# Patient Record
Sex: FEMALE | Race: BLACK OR AFRICAN AMERICAN | NOT HISPANIC OR LATINO | Employment: FULL TIME | URBAN - METROPOLITAN AREA
[De-identification: names, ages, dates, MRNs, and addresses within clinical notes are randomized per-mention and may not be internally consistent; named-entity substitution may affect disease eponyms.]

---

## 2017-01-03 ENCOUNTER — HOSPITAL ENCOUNTER (EMERGENCY)
Facility: HOSPITAL | Age: 27
Discharge: HOME | End: 2017-01-03
Attending: EMERGENCY MEDICINE
Payer: COMMERCIAL

## 2017-01-03 VITALS
OXYGEN SATURATION: 100 % | SYSTOLIC BLOOD PRESSURE: 126 MMHG | WEIGHT: 184 LBS | RESPIRATION RATE: 18 BRPM | TEMPERATURE: 98.4 F | HEART RATE: 77 BPM | DIASTOLIC BLOOD PRESSURE: 82 MMHG

## 2017-01-03 DIAGNOSIS — J20.9 ACUTE BRONCHITIS, UNSPECIFIED ORGANISM: Primary | ICD-10-CM

## 2017-01-03 PROCEDURE — 99283 EMERGENCY DEPT VISIT LOW MDM: CPT

## 2017-01-03 RX ORDER — ALBUTEROL SULFATE 90 UG/1
2 AEROSOL, METERED RESPIRATORY (INHALATION) EVERY 6 HOURS PRN
Qty: 1 INHALER | Refills: 0 | Status: SHIPPED | OUTPATIENT
Start: 2017-01-03 | End: 2017-01-13

## 2017-01-03 RX ORDER — MOMETASONE FUROATE 50 UG/1
2 SPRAY, METERED NASAL DAILY
Qty: 17 G | Refills: 0 | Status: SHIPPED | OUTPATIENT
Start: 2017-01-03 | End: 2019-11-10

## 2017-01-03 RX ORDER — AZITHROMYCIN 250 MG/1
TABLET, FILM COATED ORAL
Qty: 6 TABLET | Refills: 0 | Status: SHIPPED | OUTPATIENT
Start: 2017-01-03 | End: 2017-02-07

## 2017-01-03 RX ORDER — PREDNISONE 20 MG/1
60 TABLET ORAL DAILY
Qty: 15 TABLET | Refills: 0 | Status: SHIPPED | OUTPATIENT
Start: 2017-01-03 | End: 2017-01-08

## 2017-01-03 RX ORDER — CETIRIZINE HYDROCHLORIDE, PSEUDOEPHEDRINE HYDROCHLORIDE 5; 120 MG/1; MG/1
1 TABLET, FILM COATED, EXTENDED RELEASE ORAL 2 TIMES DAILY
Qty: 28 TABLET | Refills: 0 | Status: SHIPPED | OUTPATIENT
Start: 2017-01-03 | End: 2017-01-17

## 2017-02-08 ENCOUNTER — HOSPITAL ENCOUNTER (EMERGENCY)
Facility: HOSPITAL | Age: 27
Discharge: HOME | End: 2017-02-08
Attending: EMERGENCY MEDICINE | Admitting: EMERGENCY MEDICINE
Payer: COMMERCIAL

## 2017-02-08 VITALS
WEIGHT: 197.8 LBS | TEMPERATURE: 98.1 F | OXYGEN SATURATION: 100 % | HEART RATE: 56 BPM | DIASTOLIC BLOOD PRESSURE: 74 MMHG | BODY MASS INDEX: 36.4 KG/M2 | RESPIRATION RATE: 18 BRPM | SYSTOLIC BLOOD PRESSURE: 142 MMHG | HEIGHT: 62 IN

## 2017-02-08 DIAGNOSIS — N92.0 MENORRHAGIA: Primary | ICD-10-CM

## 2017-02-08 LAB
ANION GAP SERPL CALCULATED.3IONS-SCNC: 9 MMOL/L (ref 4–13)
APTT PPP: 31 SECONDS (ref 24–36)
BACTERIA UR QL AUTO: ABNORMAL /HPF
BASOPHILS # BLD AUTO: 0.03 THOUSANDS/ΜL (ref 0–0.1)
BASOPHILS NFR BLD AUTO: 0 % (ref 0–1)
BILIRUB UR QL STRIP: NEGATIVE
BUN SERPL-MCNC: 11 MG/DL (ref 5–25)
CALCIUM SERPL-MCNC: 8.9 MG/DL (ref 8.3–10.1)
CHLAMYDIA DNA CVX QL NAA+PROBE: NORMAL
CHLORIDE SERPL-SCNC: 108 MMOL/L (ref 100–108)
CLARITY UR: CLEAR
CLARITY, POC: NORMAL
CO2 SERPL-SCNC: 23 MMOL/L (ref 21–32)
COLOR UR: YELLOW
COLOR, POC: NORMAL
CREAT SERPL-MCNC: 0.73 MG/DL (ref 0.6–1.3)
EOSINOPHIL # BLD AUTO: 0.37 THOUSAND/ΜL (ref 0–0.61)
EOSINOPHIL NFR BLD AUTO: 5 % (ref 0–6)
ERYTHROCYTE [DISTWIDTH] IN BLOOD BY AUTOMATED COUNT: 14.2 % (ref 11.6–15.1)
EXT BILIRUBIN, UA: NORMAL
EXT BLOOD URINE: NORMAL
EXT GLUCOSE, UA: NORMAL
EXT KETONES: NORMAL
EXT NITRITE, UA: NORMAL
EXT PH, UA: 5
EXT PROTEIN, UA: NORMAL
EXT SPECIFIC GRAVITY, UA: 1.02
EXT UROBILINOGEN: 0.2
GFR SERPL CREATININE-BSD FRML MDRD: >60 ML/MIN/1.73SQ M
GLUCOSE SERPL-MCNC: 109 MG/DL (ref 65–140)
GLUCOSE UR STRIP-MCNC: NEGATIVE MG/DL
HCG UR QL: NORMAL
HCT VFR BLD AUTO: 34.1 % (ref 34.8–46.1)
HGB BLD-MCNC: 11.2 G/DL (ref 11.5–15.4)
HGB UR QL STRIP.AUTO: ABNORMAL
INR PPP: 0.96 (ref 0.86–1.16)
KETONES UR STRIP-MCNC: NEGATIVE MG/DL
LEUKOCYTE ESTERASE UR QL STRIP: NEGATIVE
LYMPHOCYTES # BLD AUTO: 3.2 THOUSANDS/ΜL (ref 0.6–4.47)
LYMPHOCYTES NFR BLD AUTO: 44 % (ref 14–44)
MCH RBC QN AUTO: 27.7 PG (ref 26.8–34.3)
MCHC RBC AUTO-ENTMCNC: 32.8 G/DL (ref 31.4–37.4)
MCV RBC AUTO: 84 FL (ref 82–98)
MONOCYTES # BLD AUTO: 0.53 THOUSAND/ΜL (ref 0.17–1.22)
MONOCYTES NFR BLD AUTO: 7 % (ref 4–12)
N GONORRHOEA DNA GENITAL QL NAA+PROBE: NORMAL
NEUTROPHILS # BLD AUTO: 3.17 THOUSANDS/ΜL (ref 1.85–7.62)
NEUTS SEG NFR BLD AUTO: 43 % (ref 43–75)
NITRITE UR QL STRIP: NEGATIVE
NON-SQ EPI CELLS URNS QL MICRO: ABNORMAL /HPF
NRBC BLD AUTO-RTO: 0 /100 WBCS
PH UR STRIP.AUTO: 5 [PH] (ref 4.5–8)
PLATELET # BLD AUTO: 269 THOUSANDS/UL (ref 149–390)
PMV BLD AUTO: 10.9 FL (ref 8.9–12.7)
POTASSIUM SERPL-SCNC: 5.1 MMOL/L (ref 3.5–5.3)
PROT UR STRIP-MCNC: NEGATIVE MG/DL
PROTHROMBIN TIME: 12.7 SECONDS (ref 12–14.3)
RBC # BLD AUTO: 4.05 MILLION/UL (ref 3.81–5.12)
RBC #/AREA URNS AUTO: ABNORMAL /HPF
SODIUM SERPL-SCNC: 140 MMOL/L (ref 136–145)
SP GR UR STRIP.AUTO: >=1.03 (ref 1–1.03)
UROBILINOGEN UR QL STRIP.AUTO: 0.2 E.U./DL
WBC # BLD AUTO: 7.31 THOUSAND/UL (ref 4.31–10.16)
WBC # BLD EST: NORMAL 10*3/UL
WBC #/AREA URNS AUTO: ABNORMAL /HPF

## 2017-02-08 PROCEDURE — 87591 N.GONORRHOEAE DNA AMP PROB: CPT | Performed by: EMERGENCY MEDICINE

## 2017-02-08 PROCEDURE — 87086 URINE CULTURE/COLONY COUNT: CPT | Performed by: EMERGENCY MEDICINE

## 2017-02-08 PROCEDURE — 99283 EMERGENCY DEPT VISIT LOW MDM: CPT

## 2017-02-08 PROCEDURE — 81025 URINE PREGNANCY TEST: CPT | Performed by: EMERGENCY MEDICINE

## 2017-02-08 PROCEDURE — 85025 COMPLETE CBC W/AUTO DIFF WBC: CPT | Performed by: EMERGENCY MEDICINE

## 2017-02-08 PROCEDURE — 80048 BASIC METABOLIC PNL TOTAL CA: CPT | Performed by: EMERGENCY MEDICINE

## 2017-02-08 PROCEDURE — 81001 URINALYSIS AUTO W/SCOPE: CPT | Performed by: EMERGENCY MEDICINE

## 2017-02-08 PROCEDURE — 36415 COLL VENOUS BLD VENIPUNCTURE: CPT | Performed by: EMERGENCY MEDICINE

## 2017-02-08 PROCEDURE — 85730 THROMBOPLASTIN TIME PARTIAL: CPT | Performed by: EMERGENCY MEDICINE

## 2017-02-08 PROCEDURE — 85610 PROTHROMBIN TIME: CPT | Performed by: EMERGENCY MEDICINE

## 2017-02-08 PROCEDURE — 87491 CHLMYD TRACH DNA AMP PROBE: CPT | Performed by: EMERGENCY MEDICINE

## 2017-02-08 PROCEDURE — 87147 CULTURE TYPE IMMUNOLOGIC: CPT | Performed by: EMERGENCY MEDICINE

## 2017-02-08 PROCEDURE — 81002 URINALYSIS NONAUTO W/O SCOPE: CPT | Performed by: EMERGENCY MEDICINE

## 2017-02-08 RX ORDER — IBUPROFEN 600 MG/1
600 TABLET ORAL ONCE
Status: DISCONTINUED | OUTPATIENT
Start: 2017-02-08 | End: 2017-02-08 | Stop reason: HOSPADM

## 2017-02-08 RX ORDER — IBUPROFEN 600 MG/1
600 TABLET ORAL EVERY 6 HOURS PRN
Qty: 30 TABLET | Refills: 0 | Status: SHIPPED | OUTPATIENT
Start: 2017-02-08 | End: 2019-11-10

## 2017-02-10 LAB
BACTERIA UR CULT: NORMAL
BACTERIA UR CULT: NORMAL

## 2017-03-24 ENCOUNTER — HOSPITAL ENCOUNTER (EMERGENCY)
Facility: HOSPITAL | Age: 27
Discharge: HOME | End: 2017-03-24
Attending: EMERGENCY MEDICINE
Payer: COMMERCIAL

## 2017-03-24 VITALS
OXYGEN SATURATION: 100 % | SYSTOLIC BLOOD PRESSURE: 132 MMHG | BODY MASS INDEX: 36.85 KG/M2 | TEMPERATURE: 98 F | DIASTOLIC BLOOD PRESSURE: 69 MMHG | RESPIRATION RATE: 18 BRPM | WEIGHT: 201.5 LBS | HEART RATE: 66 BPM

## 2017-03-24 DIAGNOSIS — K29.70 GASTRITIS: Primary | ICD-10-CM

## 2017-03-24 LAB
ALBUMIN SERPL BCP-MCNC: 3.9 G/DL (ref 3.5–5)
ALP SERPL-CCNC: 104 U/L (ref 46–116)
ALT SERPL W P-5'-P-CCNC: 21 U/L (ref 12–78)
ANION GAP SERPL CALCULATED.3IONS-SCNC: 7 MMOL/L (ref 4–13)
AST SERPL W P-5'-P-CCNC: 17 U/L (ref 5–45)
BASOPHILS # BLD AUTO: 0.04 THOUSANDS/ΜL (ref 0–0.1)
BASOPHILS NFR BLD AUTO: 1 % (ref 0–1)
BILIRUB SERPL-MCNC: 0.2 MG/DL (ref 0.2–1)
BILIRUB UR QL STRIP: NEGATIVE
BUN SERPL-MCNC: 16 MG/DL (ref 5–25)
CALCIUM SERPL-MCNC: 8.9 MG/DL (ref 8.3–10.1)
CHLORIDE SERPL-SCNC: 105 MMOL/L (ref 100–108)
CLARITY UR: CLEAR
CO2 SERPL-SCNC: 27 MMOL/L (ref 21–32)
COLOR UR: YELLOW
CREAT SERPL-MCNC: 0.74 MG/DL (ref 0.6–1.3)
EOSINOPHIL # BLD AUTO: 0.17 THOUSAND/ΜL (ref 0–0.61)
EOSINOPHIL NFR BLD AUTO: 2 % (ref 0–6)
ERYTHROCYTE [DISTWIDTH] IN BLOOD BY AUTOMATED COUNT: 14.6 % (ref 11.6–15.1)
GFR SERPL CREATININE-BSD FRML MDRD: >60 ML/MIN/1.73SQ M
GLUCOSE SERPL-MCNC: 83 MG/DL (ref 65–140)
GLUCOSE UR STRIP-MCNC: NEGATIVE MG/DL
HCG UR QL: NORMAL
HCT VFR BLD AUTO: 38 % (ref 34.8–46.1)
HGB BLD-MCNC: 12.4 G/DL (ref 11.5–15.4)
HGB UR QL STRIP.AUTO: NEGATIVE
KETONES UR STRIP-MCNC: NEGATIVE MG/DL
LEUKOCYTE ESTERASE UR QL STRIP: NEGATIVE
LIPASE SERPL-CCNC: 179 U/L (ref 73–393)
LYMPHOCYTES # BLD AUTO: 1.95 THOUSANDS/ΜL (ref 0.6–4.47)
LYMPHOCYTES NFR BLD AUTO: 22 % (ref 14–44)
MCH RBC QN AUTO: 27.3 PG (ref 26.8–34.3)
MCHC RBC AUTO-ENTMCNC: 32.6 G/DL (ref 31.4–37.4)
MCV RBC AUTO: 84 FL (ref 82–98)
MONOCYTES # BLD AUTO: 0.64 THOUSAND/ΜL (ref 0.17–1.22)
MONOCYTES NFR BLD AUTO: 7 % (ref 4–12)
NEUTROPHILS # BLD AUTO: 6.04 THOUSANDS/ΜL (ref 1.85–7.62)
NEUTS SEG NFR BLD AUTO: 68 % (ref 43–75)
NITRITE UR QL STRIP: NEGATIVE
NRBC BLD AUTO-RTO: 0 /100 WBCS
PH UR STRIP.AUTO: 6.5 [PH] (ref 4.5–8)
PLATELET # BLD AUTO: 315 THOUSANDS/UL (ref 149–390)
PMV BLD AUTO: 10.6 FL (ref 8.9–12.7)
POTASSIUM SERPL-SCNC: 4.4 MMOL/L (ref 3.5–5.3)
PROT SERPL-MCNC: 7.5 G/DL (ref 6.4–8.2)
PROT UR STRIP-MCNC: NEGATIVE MG/DL
RBC # BLD AUTO: 4.55 MILLION/UL (ref 3.81–5.12)
SODIUM SERPL-SCNC: 139 MMOL/L (ref 136–145)
SP GR UR STRIP.AUTO: 1.02 (ref 1–1.03)
UROBILINOGEN UR QL STRIP.AUTO: 0.2 E.U./DL
WBC # BLD AUTO: 8.87 THOUSAND/UL (ref 4.31–10.16)

## 2017-03-24 PROCEDURE — 81025 URINE PREGNANCY TEST: CPT | Performed by: EMERGENCY MEDICINE

## 2017-03-24 PROCEDURE — 80053 COMPREHEN METABOLIC PANEL: CPT | Performed by: EMERGENCY MEDICINE

## 2017-03-24 PROCEDURE — 85025 COMPLETE CBC W/AUTO DIFF WBC: CPT | Performed by: EMERGENCY MEDICINE

## 2017-03-24 PROCEDURE — 81003 URINALYSIS AUTO W/O SCOPE: CPT | Performed by: EMERGENCY MEDICINE

## 2017-03-24 PROCEDURE — 99284 EMERGENCY DEPT VISIT MOD MDM: CPT

## 2017-03-24 PROCEDURE — 36415 COLL VENOUS BLD VENIPUNCTURE: CPT | Performed by: EMERGENCY MEDICINE

## 2017-03-24 PROCEDURE — 83690 ASSAY OF LIPASE: CPT | Performed by: EMERGENCY MEDICINE

## 2017-03-24 RX ORDER — MAGNESIUM HYDROXIDE/ALUMINUM HYDROXICE/SIMETHICONE 120; 1200; 1200 MG/30ML; MG/30ML; MG/30ML
30 SUSPENSION ORAL ONCE
Status: COMPLETED | OUTPATIENT
Start: 2017-03-24 | End: 2017-03-24

## 2017-03-24 RX ORDER — ALBUTEROL SULFATE 90 UG/1
2 AEROSOL, METERED RESPIRATORY (INHALATION) EVERY 4 HOURS PRN
COMMUNITY

## 2017-03-24 RX ORDER — LANSOPRAZOLE 30 MG/1
30 CAPSULE, DELAYED RELEASE ORAL DAILY
Qty: 14 CAPSULE | Refills: 0 | Status: SHIPPED | OUTPATIENT
Start: 2017-03-24 | End: 2019-11-10

## 2017-03-24 RX ADMIN — LIDOCAINE HYDROCHLORIDE 10 ML: 20 SOLUTION ORAL; TOPICAL at 17:12

## 2017-03-24 RX ADMIN — ALUMINUM HYDROXIDE, MAGNESIUM HYDROXIDE, AND SIMETHICONE 30 ML: 200; 200; 20 SUSPENSION ORAL at 17:12

## 2017-04-08 ENCOUNTER — HOSPITAL ENCOUNTER (EMERGENCY)
Facility: HOSPITAL | Age: 27
Discharge: HOME | End: 2017-04-08
Attending: EMERGENCY MEDICINE | Admitting: EMERGENCY MEDICINE
Payer: COMMERCIAL

## 2017-04-08 VITALS
TEMPERATURE: 98.4 F | HEART RATE: 72 BPM | OXYGEN SATURATION: 100 % | DIASTOLIC BLOOD PRESSURE: 67 MMHG | SYSTOLIC BLOOD PRESSURE: 157 MMHG | WEIGHT: 216.93 LBS | RESPIRATION RATE: 16 BRPM | BODY MASS INDEX: 39.68 KG/M2

## 2017-04-08 DIAGNOSIS — Z20.2 STD EXPOSURE: Primary | ICD-10-CM

## 2017-04-08 LAB — HCG UR QL: POSITIVE

## 2017-04-08 PROCEDURE — 99283 EMERGENCY DEPT VISIT LOW MDM: CPT

## 2017-04-08 PROCEDURE — 87491 CHLMYD TRACH DNA AMP PROBE: CPT | Performed by: EMERGENCY MEDICINE

## 2017-04-08 PROCEDURE — 87591 N.GONORRHOEAE DNA AMP PROB: CPT | Performed by: EMERGENCY MEDICINE

## 2017-04-08 PROCEDURE — 81025 URINE PREGNANCY TEST: CPT | Performed by: EMERGENCY MEDICINE

## 2017-04-08 RX ORDER — AZITHROMYCIN 250 MG/1
1000 TABLET, FILM COATED ORAL ONCE
Status: COMPLETED | OUTPATIENT
Start: 2017-04-08 | End: 2017-04-08

## 2017-04-08 RX ADMIN — AZITHROMYCIN 1000 MG: 250 TABLET, FILM COATED ORAL at 20:11

## 2017-04-10 LAB
CHLAMYDIA DNA CVX QL NAA+PROBE: NORMAL
N GONORRHOEA DNA GENITAL QL NAA+PROBE: NORMAL

## 2017-08-08 ENCOUNTER — HOSPITAL ENCOUNTER (EMERGENCY)
Facility: HOSPITAL | Age: 27
Discharge: LEFT BEFORE DISCHARGE | End: 2017-08-08
Attending: EMERGENCY MEDICINE

## 2017-08-08 VITALS
WEIGHT: 211.2 LBS | SYSTOLIC BLOOD PRESSURE: 127 MMHG | HEART RATE: 64 BPM | BODY MASS INDEX: 38.87 KG/M2 | HEIGHT: 62 IN | OXYGEN SATURATION: 100 % | RESPIRATION RATE: 18 BRPM | DIASTOLIC BLOOD PRESSURE: 60 MMHG | TEMPERATURE: 97.6 F

## 2017-08-08 DIAGNOSIS — Z3A.22 22 WEEKS GESTATION OF PREGNANCY: ICD-10-CM
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
## 2017-08-08 DIAGNOSIS — Z71.1 WORRIED WELL: ICD-10-CM

## 2017-08-08 PROCEDURE — 99284 EMERGENCY DEPT VISIT MOD MDM: CPT

## 2019-11-10 ENCOUNTER — HOSPITAL ENCOUNTER (EMERGENCY)
Facility: HOSPITAL | Age: 29
Discharge: HOME | End: 2019-11-10
Attending: EMERGENCY MEDICINE | Admitting: EMERGENCY MEDICINE
Payer: COMMERCIAL

## 2019-11-10 VITALS
DIASTOLIC BLOOD PRESSURE: 74 MMHG | SYSTOLIC BLOOD PRESSURE: 146 MMHG | TEMPERATURE: 98.5 F | BODY MASS INDEX: 47.58 KG/M2 | WEIGHT: 260.14 LBS | RESPIRATION RATE: 18 BRPM | OXYGEN SATURATION: 98 % | HEART RATE: 47 BPM

## 2019-11-10 DIAGNOSIS — R25.2 MUSCLE CRAMPS: ICD-10-CM

## 2019-11-10 DIAGNOSIS — M72.2 PLANTAR FASCIITIS: Primary | ICD-10-CM

## 2019-11-10 LAB
ANION GAP SERPL CALCULATED.3IONS-SCNC: 10 MMOL/L (ref 4–13)
BUN SERPL-MCNC: 8 MG/DL (ref 5–25)
CALCIUM SERPL-MCNC: 9.5 MG/DL (ref 8.3–10.1)
CHLORIDE SERPL-SCNC: 103 MMOL/L (ref 100–108)
CO2 SERPL-SCNC: 25 MMOL/L (ref 21–32)
CREAT SERPL-MCNC: 0.73 MG/DL (ref 0.6–1.3)
GFR SERPL CREATININE-BSD FRML MDRD: 129 ML/MIN/1.73SQ M
GLUCOSE SERPL-MCNC: 99 MG/DL (ref 65–140)
POTASSIUM SERPL-SCNC: 4.2 MMOL/L (ref 3.5–5.3)
SODIUM SERPL-SCNC: 138 MMOL/L (ref 136–145)

## 2019-11-10 PROCEDURE — 99283 EMERGENCY DEPT VISIT LOW MDM: CPT

## 2019-11-10 PROCEDURE — 36415 COLL VENOUS BLD VENIPUNCTURE: CPT | Performed by: EMERGENCY MEDICINE

## 2019-11-10 PROCEDURE — 80048 BASIC METABOLIC PNL TOTAL CA: CPT | Performed by: EMERGENCY MEDICINE

## 2019-11-10 PROCEDURE — 99284 EMERGENCY DEPT VISIT MOD MDM: CPT | Performed by: EMERGENCY MEDICINE

## 2019-11-10 NOTE — ED PROVIDER NOTES
History  Chief Complaint   Patient presents with    Foot Pain     Pt states she had burning on the bottom of her foor [id number] and occassionally gets "michelle horses" in her legs  HPI     30-year-old female with history of asthma presenting for evaluation of burning pain in the bottom of both of her feet that has been present for the last month  Occurs when she is standing on her feet for long periods of time at work or when she walks long distances  Not present at rest   Also reports occasional charley horses throughout her legs  No calf swelling or tenderness  No history of DVT or PE  No history of diabetes the patient states that I am here because I want make sure that I do not have diabetes or high blood pressure    Denies fevers, chills, nausea, vomiting, or trauma  Prior to Admission Medications   Prescriptions Last Dose Informant Patient Reported? Taking? albuterol (PROVENTIL HFA,VENTOLIN HFA) 90 mcg/act inhaler   Yes No   Sig: Inhale 2 puffs every 4 (four) hours as needed for wheezing   levonorgestrel (MIRENA) 20 MCG/24HR IUD   Yes Yes   Si each by Intrauterine route      Facility-Administered Medications: None       Past Medical History:   Diagnosis Date    Asthma        History reviewed  No pertinent surgical history  History reviewed  No pertinent family history  I have reviewed and agree with the history as documented  Social History     Tobacco Use    Smoking status: Never Smoker   Substance Use Topics    Alcohol use: No    Drug use: No        Review of Systems   Constitutional: Negative for chills and fever  HENT: Negative for congestion  Eyes: Negative for visual disturbance  Respiratory: Negative for cough and shortness of breath  Cardiovascular: Negative for chest pain and leg swelling  Gastrointestinal: Negative for abdominal pain, diarrhea, nausea and vomiting  Genitourinary: Negative for dysuria and frequency  Musculoskeletal: Positive for myalgias  Negative for arthralgias, back pain, neck pain and neck stiffness  Skin: Negative for rash  Neurological: Negative for weakness, numbness and headaches  Psychiatric/Behavioral: Negative for agitation, behavioral problems and confusion  Physical Exam  Physical Exam   Constitutional: She is oriented to person, place, and time  No distress  obese   HENT:   Head: Normocephalic and atraumatic  Right Ear: External ear normal    Left Ear: External ear normal    Nose: Nose normal    Mouth/Throat: Oropharynx is clear and moist    Eyes: Conjunctivae are normal    Neck: Normal range of motion  Neck supple  Cardiovascular: Normal rate, regular rhythm and normal heart sounds  Exam reveals no gallop and no friction rub  No murmur heard  Pulmonary/Chest: Effort normal and breath sounds normal  No respiratory distress  She has no wheezes  She has no rales  Abdominal: Soft  Bowel sounds are normal  She exhibits no distension  There is no tenderness  There is no guarding  Musculoskeletal: Normal range of motion  She exhibits no edema or deformity  TTP over the plantar fascia bilaterally  No calf swelling or tenderness  Neurological: She is alert and oriented to person, place, and time  She exhibits normal muscle tone  Intact sensation to light touch in the bilateral LEs   Skin: Skin is warm and dry  She is not diaphoretic         Vital Signs  ED Triage Vitals   Temperature Pulse Respirations Blood Pressure SpO2   11/10/19 1312 11/10/19 1314 11/10/19 1314 11/10/19 1314 11/10/19 1314   98 5 °F (36 9 °C) 63 18 145/73 98 %      Temp Source Heart Rate Source Patient Position - Orthostatic VS BP Location FiO2 (%)   11/10/19 1312 11/10/19 1314 11/10/19 1314 11/10/19 1314 --   Oral Monitor Sitting Right arm       Pain Score       --                  Vitals:    11/10/19 1314 11/10/19 1546   BP: 145/73 146/74   Pulse: 63 (!) 47   Patient Position - Orthostatic VS: Sitting          Visual Acuity      ED Medications  Medications - No data to display    Diagnostic Studies  Results Reviewed     Procedure Component Value Units Date/Time    Basic metabolic panel [[id number]] Collected:  11/10/19 1444    Lab Status:  Final result Specimen:  Blood from Hand, Right Updated:  11/10/19 1508     Sodium 138 mmol/L      Potassium 4 2 mmol/L      Chloride 103 mmol/L      CO2 25 mmol/L      ANION GAP 10 mmol/L      BUN 8 mg/dL      Creatinine 0 73 mg/dL      Glucose 99 mg/dL      Calcium 9 5 mg/dL      eGFR 129 ml/min/1 73sq m     Narrative:       [place] guidelines for Chronic Kidney Disease (CKD):     Stage 1 with normal or high GFR (GFR > 90 mL/min/1 73 square meters)    Stage 2 Mild CKD (GFR = 60-89 mL/min/1 73 square meters)    Stage 3A Moderate CKD (GFR = 45-59 mL/min/1 73 square meters)    Stage 3B Moderate CKD (GFR = 30-44 mL/min/1 73 square meters)    Stage 4 Severe CKD (GFR = 15-29 mL/min/1 73 square meters)    Stage 5 End Stage CKD (GFR <15 mL/min/1 73 square meters)  Note: GFR calculation is accurate only with a steady state creatinine                 No orders to display              Procedures  Procedures       ED Course                               MDM  Number of Diagnoses or Management Options  Muscle cramps:   Plantar fasciitis:   Diagnosis management comments: Well appearing  Nontoxic  Afebrile and hemodynamically stable  Patient reports burning pain to the bottom of her feet, happens with standing for prolonged periods of time, she has tenderness over the plantar fascia, suspect plantar fasciitis  Recommend NSAIDs, orthotic insoles, wearing supportive shoes  BMP obtained with normal electrolytes, normal glucose  No calf swelling or tenderness to suggest DVT  Phone number provided for podiatry if symptoms persist   Patient discharged in good condition      Patient Progress  Patient progress: stable           Disposition  Final diagnoses:   Plantar fasciitis   Muscle cramps Time reflects when diagnosis was documented in both MDM as applicable and the Disposition within this note     Time User Action Codes Description Comment    11/10/2019  3:56 PM Leny Lowery [M72 2] Plantar fasciitis     11/10/2019  3:56 PM Leny Lowery [R25 2] Muscle cramps       ED Disposition     ED Disposition Condition Date/Time Comment    Discharge Stable Sun Nov 10, 2019  3:56 PM Jair Juares discharge to home/self care  Follow-up Information     Follow up With Specialties Details Why Contact Info Additional Information    Isaac Gay MD Internal Medicine  Please establish with a primary care provider  [address] [facility] Emergency [facility] Emergency Medicine  As needed, If symptoms worsen [address] [facility] 149 ED, [address] [place] [address], [address] [facility]   If foot pain persists  [address]  [phone]           Discharge Medication List as of 11/10/2019  4:00 PM      CONTINUE these medications which have NOT CHANGED    Details   levonorgestrel (MIRENA) 20 MCG/24HR IUD 1 each by Intrauterine route, Starting Tue 5/28/2019, Historical Med      albuterol (PROVENTIL HFA,VENTOLIN HFA) 90 mcg/act inhaler Inhale 2 puffs every 4 (four) hours as needed for wheezing, Until Discontinued, Historical Med           No discharge procedures on file      ED Provider  Electronically Signed by           Nakul Salinas MD  11/11/19 1007

## 2020-07-06 ENCOUNTER — HOSPITAL ENCOUNTER (EMERGENCY)
Facility: HOSPITAL | Age: 30
Discharge: HOME | End: 2020-07-06
Attending: EMERGENCY MEDICINE | Admitting: EMERGENCY MEDICINE
Payer: COMMERCIAL

## 2020-07-06 VITALS
SYSTOLIC BLOOD PRESSURE: 124 MMHG | HEART RATE: 65 BPM | DIASTOLIC BLOOD PRESSURE: 64 MMHG | WEIGHT: 263 LBS | BODY MASS INDEX: 48.4 KG/M2 | TEMPERATURE: 97.6 F | RESPIRATION RATE: 18 BRPM | HEIGHT: 62 IN | OXYGEN SATURATION: 100 %

## 2020-07-06 DIAGNOSIS — R21 RASH OF HANDS: Primary | ICD-10-CM

## 2020-07-06 PROCEDURE — 99284 EMERGENCY DEPT VISIT MOD MDM: CPT | Performed by: EMERGENCY MEDICINE

## 2020-07-06 PROCEDURE — 99283 EMERGENCY DEPT VISIT LOW MDM: CPT

## 2020-07-06 RX ORDER — TRIAMCINOLONE ACETONIDE 5 MG/G
CREAM TOPICAL 3 TIMES DAILY
Qty: 30 G | Refills: 0 | Status: SHIPPED | OUTPATIENT
Start: 2020-07-06

## 2020-07-06 RX ORDER — PETROLATUM 0.61 G/G
CREAM TOPICAL AS NEEDED
Qty: 397 G | Refills: 0 | Status: SHIPPED | OUTPATIENT
Start: 2020-07-06

## 2020-07-06 NOTE — DISCHARGE INSTRUCTIONS
Call the number provided for rheumatology and dermatology follow up as soon as possible  In the meantime use steroid cream to help with swelling and eucerin to keep hydrated   Please follow up with your primary care doctor additionally

## 2020-07-06 NOTE — ED PROVIDER NOTES
History  Chief Complaint   Patient presents with    Medical Problem     reports dry and scaling skin to L hand x3 months, with heaviness in b/l hands  saw PCP, unable to diagnose  HPI  33 yo F presents with dry skin to both hands for the past three months  She states she saw her PCP for this but did not receive any treatment  She states she feels like her fingers are swelling  No trauma  Prior to Admission Medications   Prescriptions Last Dose Informant Patient Reported? Taking? albuterol (PROVENTIL HFA,VENTOLIN HFA) 90 mcg/act inhaler   Yes No   Sig: Inhale 2 puffs every 4 (four) hours as needed for wheezing   levonorgestrel (MIRENA) 20 MCG/24HR IUD   Yes No   Si each by Intrauterine route      Facility-Administered Medications: None       Past Medical History:   Diagnosis Date    Asthma        History reviewed  No pertinent surgical history  History reviewed  No pertinent family history  I have reviewed and agree with the history as documented  E-Cigarette/Vaping    E-Cigarette Use Never User      E-Cigarette/Vaping Substances     Social History     Tobacco Use    Smoking status: Never Smoker    Smokeless tobacco: Never Used   Substance Use Topics    Alcohol use: No    Drug use: No       Review of Systems   Constitutional: Negative for chills and fever  HENT: Negative for dental problem and ear pain  Eyes: Negative for pain and redness  Respiratory: Negative for cough and shortness of breath  Cardiovascular: Negative for chest pain and palpitations  Gastrointestinal: Negative for abdominal pain and nausea  Endocrine: Negative for polydipsia and polyphagia  Genitourinary: Negative for dysuria and frequency  Musculoskeletal: Negative for arthralgias and joint swelling  Skin: Positive for rash  Negative for color change  Neurological: Negative for dizziness and headaches  Psychiatric/Behavioral: Negative for behavioral problems and confusion     All other systems reviewed and are negative  Physical Exam  Physical Exam   Constitutional: She is oriented to person, place, and time  She appears well-developed and well-nourished  No distress  HENT:   Head: Normocephalic and atraumatic  Right Ear: External ear normal    Left Ear: External ear normal    Eyes: Pupils are equal, round, and reactive to light  Conjunctivae are normal  Right eye exhibits no discharge  Left eye exhibits no discharge  No scleral icterus  Neck: Normal range of motion  Neck supple  No JVD present  Cardiovascular: Normal rate  Pulmonary/Chest: Effort normal    Musculoskeletal: Normal range of motion  She exhibits no deformity  Neurological: She is alert and oriented to person, place, and time  Skin: Skin is dry  She is not diaphoretic  No erythema  Scaly skin to flexor surfaces b/l hands, n/v intact   Psychiatric: She has a normal mood and affect  Nursing note and vitals reviewed  Vital Signs  ED Triage Vitals [07/06/20 1430]   Temperature Pulse Respirations Blood Pressure SpO2   97 6 °F (36 4 °C) 65 18 124/64 100 %      Temp src Heart Rate Source Patient Position - Orthostatic VS BP Location FiO2 (%)   -- -- -- -- --      Pain Score       3           Vitals:    07/06/20 1430   BP: 124/64   Pulse: 65         Visual Acuity      ED Medications  Medications - No data to display    Diagnostic Studies  Results Reviewed     None                 No orders to display              Procedures  Procedures         ED Course       US AUDIT      Most Recent Value   Initial Alcohol Screen: US AUDIT-C    1  How often do you have a drink containing alcohol?  0 Filed at: 07/06/2020 1453   2  How many drinks containing alcohol do you have on a typical day you are drinking? 0 Filed at: 07/06/2020 1453   3b  FEMALE Any Age, or MALE 65+: How often do you have 4 or more drinks on one occassion?   0 Filed at: 07/06/2020 1453   Audit-C Score  0 Filed at: 07/06/2020 1453                  JAMILA/DAST-10 Most Recent Value   How many times in the past year have you    Used an illegal drug or used a prescription medication for non-medical reasons? Never Filed at: 2020 9976                                [facility]  Patient presents with chronic rash on hands  No signs of infection  Appears well  Will rx steroid cream and eucerin cream and refer to dermatology and rheumatology  Disposition  Final diagnoses:   Rash of hands     Time reflects when diagnosis was documented in both MDM as applicable and the Disposition within this note     Time User Action Codes Description Comment    2020  2:54 PM Margarie Paget Add [R21] Rash of hands       ED Disposition     ED Disposition Condition Date/Time Comment    Discharge Stable   2:53 PM Marsha Fuentes discharge to home/self care  Follow-up Information     Follow up With Specialties Details Why Contact Info Additional Information    [facility] Call  for rheumatology follow up Tonya  Sonya Quintero [address] [facility] [address] [facility], [address] [place] [address], Eber Hahn 1    Jan Haskins MD Dermatology Call  for dermatology follow up [address] [place] [id number] [phone]       Brina Laoz MD Internal Medicine Call   [address] [phone]             Discharge Medication List as of 2020  2:57 PM      START taking these medications    Details   Skin Protectants, Misc   (EUCERIN) cream Apply topically as needed for dry skin, Starting 2020, Print      triamcinolone (KENALOG) 0 5 % cream Apply topically 3 (three) times a day, Starting 2020, Print         CONTINUE these medications which have NOT CHANGED    Details   albuterol (PROVENTIL HFA,VENTOLIN HFA) 90 mcg/act inhaler Inhale 2 puffs every 4 (four) hours as needed for wheezing, Until Discontinued, Historical Med      levonorgestrel (MIRENA) 20 MCG/24HR IUD 1 each by Intrauterine route, Starting Tue 5/28/2019, Historical Med           No discharge procedures on file      PDMP Review     None          ED Provider  Electronically Signed by           Cheri Ross MD  07/06/20 0815

## 2021-05-11 ENCOUNTER — PATIENT OUTREACH (OUTPATIENT)
Dept: SURGERY | Facility: CLINIC | Age: 31
End: 2021-05-11

## 2024-04-26 ENCOUNTER — APPOINTMENT (EMERGENCY)
Dept: RADIOLOGY | Facility: HOSPITAL | Age: 34
End: 2024-04-26
Payer: COMMERCIAL

## 2024-04-26 ENCOUNTER — HOSPITAL ENCOUNTER (EMERGENCY)
Facility: HOSPITAL | Age: 34
Discharge: HOME | End: 2024-04-26
Attending: EMERGENCY MEDICINE
Payer: COMMERCIAL

## 2024-04-26 ENCOUNTER — APPOINTMENT (EMERGENCY)
Dept: CT IMAGING | Facility: HOSPITAL | Age: 34
End: 2024-04-26
Payer: COMMERCIAL

## 2024-04-26 VITALS
SYSTOLIC BLOOD PRESSURE: 136 MMHG | TEMPERATURE: 97.7 F | RESPIRATION RATE: 20 BRPM | OXYGEN SATURATION: 100 % | HEART RATE: 79 BPM | DIASTOLIC BLOOD PRESSURE: 64 MMHG

## 2024-04-26 DIAGNOSIS — R42 LIGHTHEADEDNESS: Primary | ICD-10-CM

## 2024-04-26 DIAGNOSIS — R51.9 HEADACHE: ICD-10-CM

## 2024-04-26 DIAGNOSIS — R00.2 PALPITATIONS: ICD-10-CM

## 2024-04-26 DIAGNOSIS — R55 NEAR SYNCOPE: ICD-10-CM

## 2024-04-26 LAB
ALBUMIN SERPL BCP-MCNC: 4 G/DL (ref 3.5–5)
ALP SERPL-CCNC: 90 U/L (ref 34–104)
ALT SERPL W P-5'-P-CCNC: 14 U/L (ref 7–52)
ANION GAP SERPL CALCULATED.3IONS-SCNC: 8 MMOL/L (ref 4–13)
AST SERPL W P-5'-P-CCNC: 15 U/L (ref 13–39)
ATRIAL RATE: 77 BPM
BASOPHILS # BLD AUTO: 0.02 THOUSANDS/ÂΜL (ref 0–0.1)
BASOPHILS NFR BLD AUTO: 0 % (ref 0–1)
BILIRUB SERPL-MCNC: 0.33 MG/DL (ref 0.2–1)
BUN SERPL-MCNC: 9 MG/DL (ref 5–25)
CALCIUM SERPL-MCNC: 9.1 MG/DL (ref 8.4–10.2)
CARDIAC TROPONIN I PNL SERPL HS: <2 NG/L
CHLORIDE SERPL-SCNC: 105 MMOL/L (ref 96–108)
CO2 SERPL-SCNC: 24 MMOL/L (ref 21–32)
CREAT SERPL-MCNC: 0.65 MG/DL (ref 0.6–1.3)
D DIMER PPP FEU-MCNC: <0.27 UG/ML FEU
EOSINOPHIL # BLD AUTO: 0.26 THOUSAND/ÂΜL (ref 0–0.61)
EOSINOPHIL NFR BLD AUTO: 4 % (ref 0–6)
ERYTHROCYTE [DISTWIDTH] IN BLOOD BY AUTOMATED COUNT: 14.1 % (ref 11.6–15.1)
GFR SERPL CREATININE-BSD FRML MDRD: 117 ML/MIN/1.73SQ M
GLUCOSE SERPL-MCNC: 132 MG/DL (ref 65–140)
HCG SERPL QL: NEGATIVE
HCT VFR BLD AUTO: 33.3 % (ref 34.8–46.1)
HGB BLD-MCNC: 10.6 G/DL (ref 11.5–15.4)
IMM GRANULOCYTES # BLD AUTO: 0.01 THOUSAND/UL (ref 0–0.2)
IMM GRANULOCYTES NFR BLD AUTO: 0 % (ref 0–2)
LYMPHOCYTES # BLD AUTO: 1.92 THOUSANDS/ÂΜL (ref 0.6–4.47)
LYMPHOCYTES NFR BLD AUTO: 26 % (ref 14–44)
MCH RBC QN AUTO: 26.4 PG (ref 26.8–34.3)
MCHC RBC AUTO-ENTMCNC: 31.8 G/DL (ref 31.4–37.4)
MCV RBC AUTO: 83 FL (ref 82–98)
MONOCYTES # BLD AUTO: 0.39 THOUSAND/ÂΜL (ref 0.17–1.22)
MONOCYTES NFR BLD AUTO: 5 % (ref 4–12)
NEUTROPHILS # BLD AUTO: 4.67 THOUSANDS/ÂΜL (ref 1.85–7.62)
NEUTS SEG NFR BLD AUTO: 65 % (ref 43–75)
NRBC BLD AUTO-RTO: 0 /100 WBCS
P AXIS: 58 DEGREES
PLATELET # BLD AUTO: 309 THOUSANDS/UL (ref 149–390)
PMV BLD AUTO: 10.8 FL (ref 8.9–12.7)
POTASSIUM SERPL-SCNC: 3.8 MMOL/L (ref 3.5–5.3)
PR INTERVAL: 184 MS
PROT SERPL-MCNC: 6.7 G/DL (ref 6.4–8.4)
QRS AXIS: 88 DEGREES
QRSD INTERVAL: 90 MS
QT INTERVAL: 394 MS
QTC INTERVAL: 445 MS
RBC # BLD AUTO: 4.02 MILLION/UL (ref 3.81–5.12)
SODIUM SERPL-SCNC: 137 MMOL/L (ref 135–147)
T WAVE AXIS: 5 DEGREES
TSH SERPL DL<=0.05 MIU/L-ACNC: 2.74 UIU/ML (ref 0.45–4.5)
VENTRICULAR RATE: 77 BPM
WBC # BLD AUTO: 7.27 THOUSAND/UL (ref 4.31–10.16)

## 2024-04-26 PROCEDURE — 96360 HYDRATION IV INFUSION INIT: CPT

## 2024-04-26 PROCEDURE — 70450 CT HEAD/BRAIN W/O DYE: CPT

## 2024-04-26 PROCEDURE — 99284 EMERGENCY DEPT VISIT MOD MDM: CPT

## 2024-04-26 PROCEDURE — 84703 CHORIONIC GONADOTROPIN ASSAY: CPT | Performed by: EMERGENCY MEDICINE

## 2024-04-26 PROCEDURE — 96361 HYDRATE IV INFUSION ADD-ON: CPT

## 2024-04-26 PROCEDURE — 80053 COMPREHEN METABOLIC PANEL: CPT | Performed by: EMERGENCY MEDICINE

## 2024-04-26 PROCEDURE — 99284 EMERGENCY DEPT VISIT MOD MDM: CPT | Performed by: EMERGENCY MEDICINE

## 2024-04-26 PROCEDURE — 93010 ELECTROCARDIOGRAM REPORT: CPT | Performed by: INTERNAL MEDICINE

## 2024-04-26 PROCEDURE — 85379 FIBRIN DEGRADATION QUANT: CPT | Performed by: EMERGENCY MEDICINE

## 2024-04-26 PROCEDURE — 93005 ELECTROCARDIOGRAM TRACING: CPT

## 2024-04-26 PROCEDURE — 71046 X-RAY EXAM CHEST 2 VIEWS: CPT

## 2024-04-26 PROCEDURE — 85025 COMPLETE CBC W/AUTO DIFF WBC: CPT | Performed by: EMERGENCY MEDICINE

## 2024-04-26 PROCEDURE — 84443 ASSAY THYROID STIM HORMONE: CPT | Performed by: EMERGENCY MEDICINE

## 2024-04-26 PROCEDURE — 84484 ASSAY OF TROPONIN QUANT: CPT | Performed by: EMERGENCY MEDICINE

## 2024-04-26 PROCEDURE — 36415 COLL VENOUS BLD VENIPUNCTURE: CPT | Performed by: EMERGENCY MEDICINE

## 2024-04-26 RX ORDER — SODIUM CHLORIDE 9 MG/ML
3 INJECTION INTRAVENOUS
Status: DISCONTINUED | OUTPATIENT
Start: 2024-04-26 | End: 2024-04-26 | Stop reason: HOSPADM

## 2024-04-26 RX ADMIN — SODIUM CHLORIDE 1000 ML: 0.9 INJECTION, SOLUTION INTRAVENOUS at 14:54

## 2024-04-26 NOTE — ED PROVIDER NOTES
History  Chief Complaint   Patient presents with    Headache     Would like a second opinion regarding evaluation at [facility] on wednesday for headache,dizziness, right hand  & bilateral leg shaking, numbness and palpitations. Denies symptoms at this time.      32 y/o female presents to the ED for lightheadedness, near syncope, palpitations, and headache. She states that she was at work 1 week ago and developed lightheadedness, dizziness, headache, palpitations, and bilateral leg shaking. States that she was seen at [facility] and had a neg workup and was d/c. She states that she had another episode a few days ago while working that lasted about 30 mins. States that she again was seen in the ED and had another neg workup including ct head. States that she has an appointment with cardiology in the beginning of May.  She states that she is here for a second opinion.  States that they always do the workup when she does not have symptoms and is concerned that they are missing something.  She states that she currently does not have symptoms here.  She states that she has not had symptoms since her last episode on Wednesday when she was evaluated at the other hospital.  No other complaints.      History provided by:  Patient      Prior to Admission Medications   Prescriptions Last Dose Informant Patient Reported? Taking?   Skin Protectants, Misc. (EUCERIN) cream   No No   Sig: Apply topically as needed for dry skin   albuterol (PROVENTIL HFA,VENTOLIN HFA) 90 mcg/act inhaler   Yes No   Sig: Inhale 2 puffs every 4 (four) hours as needed for wheezing   levonorgestrel (MIRENA) 20 MCG/24HR IUD   Yes No   Si each by Intrauterine route   triamcinolone (KENALOG) 0.5 % cream   No No   Sig: Apply topically 3 (three) times a day      Facility-Administered Medications: None       Past Medical History:   Diagnosis Date    Asthma     Diabetes mellitus (HCC)        History reviewed. No pertinent surgical  history.    History reviewed. No pertinent family history.  I have reviewed and agree with the history as documented.    E-Cigarette/Vaping    E-Cigarette Use Never User      E-Cigarette/Vaping Substances     Social History     Tobacco Use    Smoking status: Never    Smokeless tobacco: Never   Vaping Use    Vaping status: Never Used   Substance Use Topics    Alcohol use: No    Drug use: No       Review of Systems   Constitutional:  Negative for chills and fever.   HENT:  Negative for congestion, ear pain and sore throat.    Eyes:  Negative for pain and visual disturbance.   Respiratory:  Negative for cough, shortness of breath and wheezing.    Cardiovascular:  Negative for chest pain and leg swelling.   Gastrointestinal:  Negative for abdominal pain, diarrhea, nausea and vomiting.   Genitourinary:  Negative for dysuria, frequency, hematuria and urgency.   Musculoskeletal:  Negative for neck pain and neck stiffness.   Skin:  Negative for rash and wound.   Neurological:  Positive for dizziness, light-headedness and headaches. Negative for weakness and numbness.   Psychiatric/Behavioral:  Negative for agitation and confusion.    All other systems reviewed and are negative.      Physical Exam  Physical Exam  Vitals and nursing note reviewed.   Constitutional:       Appearance: She is well-developed.   HENT:      Head: Normocephalic and atraumatic.   Eyes:      Pupils: Pupils are equal, round, and reactive to light.   Cardiovascular:      Rate and Rhythm: Normal rate and regular rhythm.   Pulmonary:      Effort: Pulmonary effort is normal.      Breath sounds: Normal breath sounds.   Abdominal:      General: Bowel sounds are normal.      Palpations: Abdomen is soft.   Musculoskeletal:         General: Normal range of motion.      Cervical back: Normal range of motion and neck supple.   Skin:     General: Skin is warm and dry.   Neurological:      General: No focal deficit present.      Mental Status: She is alert and  oriented to person, place, and time.      Comments: No focal deficits         Vital Signs  ED Triage Vitals [04/26/24 1329]   Temperature Pulse Respirations Blood Pressure SpO2   97.7 °F (36.5 °C) 77 20 138/76 98 %      Temp Source Heart Rate Source Patient Position - Orthostatic VS BP Location FiO2 (%)   Tympanic Monitor Sitting Left arm --      Pain Score       --           Vitals:    04/26/24 1329 04/26/24 1445 04/26/24 1600   BP: 138/76 126/59 136/64   Pulse: 77 75 79   Patient Position - Orthostatic VS: Sitting Lying Lying         Visual Acuity      ED Medications  Medications   sodium chloride (PF) 0.9 % injection 3 mL (has no administration in time range)   sodium chloride 0.9 % bolus 1,000 mL (0 mL Intravenous Stopped 4/26/24 1654)       Diagnostic Studies  Results Reviewed       Procedure Component Value Units Date/Time    TSH, 3rd generation with Free T4 reflex [[id number]]  (Normal) Collected: 04/26/24 1450    Lab Status: Final result Specimen: Blood from Hand, Right Updated: 04/26/24 1536     TSH 3RD GENERATON 2.743 uIU/mL     hCG, qualitative pregnancy [[id number]]  (Normal) Collected: 04/26/24 1450    Lab Status: Final result Specimen: Blood from Hand, Right Updated: 04/26/24 1536     Preg, Serum Negative    HS Troponin 0hr (reflex protocol) [[id number]]  (Normal) Collected: 04/26/24 1450    Lab Status: Final result Specimen: Blood from Hand, Right Updated: 04/26/24 1526     hs TnI 0hr <2 ng/L     D-dimer, quantitative [[id number]]  (Normal) Collected: 04/26/24 1450    Lab Status: Final result Specimen: Blood from Arm, Right Updated: 04/26/24 1522     D-Dimer, Quant <0.27 ug/ml FEU     Comprehensive metabolic panel [[id number]] Collected: 04/26/24 1450    Lab Status: Final result Specimen: Blood from Hand, Right Updated: 04/26/24 1521     Sodium 137 mmol/L      Potassium 3.8 mmol/L      Chloride 105 mmol/L      CO2 24 mmol/L      ANION GAP 8 mmol/L      BUN 9 mg/dL      Creatinine 0.65 mg/dL      Glucose  132 mg/dL      Calcium 9.1 mg/dL      AST 15 U/L      ALT 14 U/L      Alkaline Phosphatase 90 U/L      Total Protein 6.7 g/dL      Albumin 4.0 g/dL      Total Bilirubin 0.33 mg/dL      eGFR 117 ml/min/1.73sq m     Narrative:      National Kidney Disease Foundation guidelines for Chronic Kidney Disease (CKD):     Stage 1 with normal or high GFR (GFR > 90 mL/min/1.73 square meters)    Stage 2 Mild CKD (GFR = 60-89 mL/min/1.73 square meters)    Stage 3A Moderate CKD (GFR = 45-59 mL/min/1.73 square meters)    Stage 3B Moderate CKD (GFR = 30-44 mL/min/1.73 square meters)    Stage 4 Severe CKD (GFR = 15-29 mL/min/1.73 square meters)    Stage 5 End Stage CKD (GFR <15 mL/min/1.73 square meters)  Note: GFR calculation is accurate only with a steady state creatinine    CBC and differential [[id number]]  (Abnormal) Collected: 04/26/24 1450    Lab Status: Final result Specimen: Blood from Hand, Right Updated: 04/26/24 1458     WBC 7.27 Thousand/uL      RBC 4.02 Million/uL      Hemoglobin 10.6 g/dL      Hematocrit 33.3 %      MCV 83 fL      MCH 26.4 pg      MCHC 31.8 g/dL      RDW 14.1 %      MPV 10.8 fL      Platelets 309 Thousands/uL      nRBC 0 /100 WBCs      Segmented % 65 %      Immature Grans % 0 %      Lymphocytes % 26 %      Monocytes % 5 %      Eosinophils Relative 4 %      Basophils Relative 0 %      Absolute Neutrophils 4.67 Thousands/µL      Absolute Immature Grans 0.01 Thousand/uL      Absolute Lymphocytes 1.92 Thousands/µL      Absolute Monocytes 0.39 Thousand/µL      Eosinophils Absolute 0.26 Thousand/µL      Basophils Absolute 0.02 Thousands/µL                    X-ray chest 2 views   Final Result by Wilbur Leone MD (04/26 1649)      No acute cardiopulmonary disease.            Workstation performed: [id number]         CT head without contrast   Final Result by Estuardo Arellano MD (04/26 1534)      No acute intracranial abnormality.                  Workstation performed: [id number]                     Procedures  ECG 12 Lead Documentation Only    Date/Time: 4/26/2024 2:45 PM    Performed by: Alisha Mayer DO  Authorized by: Alisha Mayer DO    Indications / Diagnosis:  Lightheadedness  Patient location:  ED  Rate:     ECG rate:  77    ECG rate assessment: normal    Rhythm:     Rhythm: sinus rhythm    Ectopy:     Ectopy: none    QRS:     QRS axis:  Normal    QRS intervals:  Normal  ST segments:     ST segments:  Normal  T waves:     T waves: normal             ED Course  ED Course as of 04/26/24 1822   Fri Apr 26, 2024   1348 1 week ago Wednesday at work and got an episode of dizziness, lightheadedness, and bilateral legs started shaking and then this past Wednesday she had a headache and episiode started again. Cardiology                                 SBIRT 22yo+      Flowsheet Row Most Recent Value   Initial Alcohol Screen: US AUDIT-C     1. How often do you have a drink containing alcohol? 0 Filed at: 04/26/2024 1425   2. How many drinks containing alcohol do you have on a typical day you are drinking?  0 Filed at: 04/26/2024 1425   3b. FEMALE Any Age, or MALE 65+: How often do you have 4 or more drinks on one occassion? 0 Filed at: 04/26/2024 1425   Audit-C Score 0 Filed at: 04/26/2024 1425   JAMILA: How many times in the past year have you...    Used an illegal drug or used a prescription medication for non-medical reasons? Never Filed at: 04/26/2024 1425                      Medical Decision Making  34 y/o female with lightheadedness, palpiations, near syncope, and headache- will get labs, trop/ EKG, and ct head.     Labs and ct scan reviewed and neg- will d/c and instruct outpatient follow up     Amount and/or Complexity of Data Reviewed  Labs: ordered.  Radiology: ordered.    Risk  Prescription drug management.             Disposition  Final diagnoses:   Lightheadedness   Palpitations   Headache   Near syncope     Time reflects when diagnosis was documented in both MDM as applicable and the  Disposition within this note       Time User Action Codes Description Comment    4/26/2024  4:20 PM Alisha Mayer Add [R42] Lightheadedness     4/26/2024  4:20 PM Alisha Mayer Add [R00.2] Palpitations     4/26/2024  4:20 PM Alisha Mayer Add [R51.9] Headache     4/26/2024  4:20 PM Alisha Mayer Add [R55] Near syncope           ED Disposition       ED Disposition   Discharge    Condition   Stable    Date/Time   Fri Apr 26, 2024 1619    Comment   Patricia Decker discharge to home/self care.                   Follow-up Information       Follow up With Specialties Details Why Contact Info Additional Information    Aurelia Paula MD Internal Medicine Call in 1 day for follow up within 2-3 days [address] [facility] [address]  [phone]       [facility] Cardiology [facility] Cardiology Call in 1 day for follow up [address]  [address]  [facility] [id number] [phone] [facility], [address], [facility] [address], [place], [place], [address]   [phone]    [facility] Emergency Department Emergency Medicine Go to  immediately for any new or worsening symptoms [address] [facility]3] [id number] [phone] [facility], [facility] Emergency Department, [address] [place], [place], [address]            Discharge Medication List as of 4/26/2024  4:21 PM        CONTINUE these medications which have NOT CHANGED    Details   albuterol (PROVENTIL HFA,VENTOLIN HFA) 90 mcg/act inhaler Inhale 2 puffs every 4 (four) hours as needed for wheezing, Until Discontinued, Historical Med      levonorgestrel (MIRENA) 20 MCG/24HR IUD 1 each by Intrauterine route, Starting Tue 5/28/2019, Historical Med      Skin Protectants, Misc. (EUCERIN) cream Apply topically as needed for dry skin, Starting Mon 7/6/2020, Print      triamcinolone (KENALOG) 0.5 % cream Apply topically  3 (three) times a day, Starting Mon 7/6/2020, Print             No discharge procedures on file.    PDMP Review       None            ED Provider  Electronically Signed by             Alisha Mayer DO  04/26/24 0437